# Patient Record
Sex: MALE | Race: WHITE | Employment: FULL TIME | URBAN - METROPOLITAN AREA
[De-identification: names, ages, dates, MRNs, and addresses within clinical notes are randomized per-mention and may not be internally consistent; named-entity substitution may affect disease eponyms.]

---

## 2019-04-10 ENCOUNTER — APPOINTMENT (OUTPATIENT)
Dept: GENERAL RADIOLOGY | Age: 19
End: 2019-04-10
Attending: PEDIATRICS
Payer: COMMERCIAL

## 2019-04-10 ENCOUNTER — HOSPITAL ENCOUNTER (EMERGENCY)
Age: 19
Discharge: HOME | End: 2019-04-10
Attending: PEDIATRICS
Payer: COMMERCIAL

## 2019-04-10 VITALS
TEMPERATURE: 97.3 F | OXYGEN SATURATION: 97 % | WEIGHT: 166.01 LBS | HEART RATE: 96 BPM | DIASTOLIC BLOOD PRESSURE: 81 MMHG | SYSTOLIC BLOOD PRESSURE: 131 MMHG | RESPIRATION RATE: 18 BRPM

## 2019-04-10 DIAGNOSIS — S69.91XA INJURY OF RIGHT HAND, INITIAL ENCOUNTER: Primary | ICD-10-CM

## 2019-04-10 PROCEDURE — 99281 EMR DPT VST MAYX REQ PHY/QHP: CPT

## 2019-04-10 PROCEDURE — 73130 X-RAY EXAM OF HAND: CPT

## 2019-04-10 NOTE — ED PROVIDER NOTES
HPI  
History of present illness: 
 
Patient is a 71-year-old male previously well presents with right hand injury. He states he was in his usual state of good health. He states he was hiking yesterday tripped on a tree stump fell over onto his right hand which was in a fist position. Since then he is complaining of metacarpal and digital pain over the lateral aspect of his hand and fourth digit. Pain is increased today. Is concerned that he may have fractured his hand. No head injury no loss of consciousness no neck pain or trouble breathing no abdominal pain. No other injuries. He states he drinks well without any issue. No medications or other concerns Review of systems: A 10 point review was conducted. All pertinent positives and negatives are as stated in history of present illness Allergies: None Medications: None Immunizations: Up to date Past medical history: Unremarkable Family history: Noncontributory to this visit Social history: This with family. Attends school. History reviewed. No pertinent past medical history. History reviewed. No pertinent surgical history. History reviewed. No pertinent family history. Social History Socioeconomic History  Marital status: Not on file Spouse name: Not on file  Number of children: Not on file  Years of education: Not on file  Highest education level: Not on file Occupational History  Not on file Social Needs  Financial resource strain: Not on file  Food insecurity:  
  Worry: Not on file Inability: Not on file  Transportation needs:  
  Medical: Not on file Non-medical: Not on file Tobacco Use  Smoking status: Not on file Substance and Sexual Activity  Alcohol use: Not on file  Drug use: Not on file  Sexual activity: Not on file Lifestyle  Physical activity:  
  Days per week: Not on file Minutes per session: Not on file  Stress: Not on file Relationships  Social connections:  
  Talks on phone: Not on file Gets together: Not on file Attends Zoroastrianism service: Not on file Active member of club or organization: Not on file Attends meetings of clubs or organizations: Not on file Relationship status: Not on file  Intimate partner violence:  
  Fear of current or ex partner: Not on file Emotionally abused: Not on file Physically abused: Not on file Forced sexual activity: Not on file Other Topics Concern  Not on file Social History Narrative  Not on file ALLERGIES: Patient has no known allergies. Review of Systems Constitutional: Negative for activity change and fever. HENT: Negative for ear pain and trouble swallowing. Eyes: Negative for visual disturbance. Respiratory: Negative for cough and shortness of breath. Cardiovascular: Negative for chest pain. Gastrointestinal: Negative for abdominal pain and vomiting. Genitourinary: Negative for decreased urine volume and dysuria. Musculoskeletal: Positive for joint swelling. Skin: Negative for rash. All other systems reviewed and are negative. Vitals:  
 04/10/19 1054 BP: 131/81 Pulse: 96  
Resp: 18 Temp: 97.3 °F (36.3 °C) SpO2: 97% Weight: 75.3 kg (166 lb 0.1 oz) Physical Exam  
Nursing note and vitals reviewed. PE: 
GEN:  WDWN male alert non toxic in NAD talkative interactive well appearing SK: CRT < 2 sec, good distal pulses. No lesions, no rashes HEENT: H: AT/NC. E: EOMI , PERRL, E: TM clear  N/T: Clear oropharynx NECK: supple, no meningismus. No pain on palpation Chest: Clear to auscultation, clear BS. NO rales, rhonchi, wheezes or distress. No  Retraction. Chest Wall: no tenderness on palpation CV: Regular rate and rhythm. Normal S1 S2 . No murmur, gallops or thrills ABD: Soft non tender, no hepatomegaly, good bowel sound, no guarding, benign MS: FROM all extremities, no long bone tenderness. No swelling, cyanosis, no edema. Good distal pulses. Gait normal 
Right hand: minimal STS over lateral hand, + ecchymosis of prox pharynx 4th digit, neurovasc iontact NEURO: Alert. No focality. Cranial nerves 2-12 grossly intact. GCS 15  Behavior and mentation appropriate for age MDM Number of Diagnoses or Management Options Injury of right hand, initial encounter:  
Diagnosis management comments: Medical decision making: 
 
Differential diagnosis includes: Fracture versus contusion X-ray: No fracture Home on symptomatic care with Motrin as needed Patient's results have been reviewed with them. Patient and /or family have verbally conveyed understanding and agreement of the patient's signs, symptoms, diagnosis, treatment and prognosis and additionally agree to follow up as recommended or return to the Emergency Department should their condition change prior to follow-up. Discharge instructions have also been provided to the patient with some educational information regarding their diagnosis as well as a list of reasons why they would want to return to the ER prior to their follow-up appointment should their condition change. Clinical impression: 
Right hand injury Amount and/or Complexity of Data Reviewed Tests in the radiology section of CPT®: ordered and reviewed Independent visualization of images, tracings, or specimens: yes Procedures

## 2019-04-10 NOTE — ED NOTES
Pt discharged home. Pt alert, respirations regular and unlabored, cap refill less than two seconds. Skin pink, dry and warm. Lungs clear bilaterally. No further complaints at this time. Patient verbalized understanding of discharge paperwork and has no further questions at this time. Education provided about continuation of care, follow up care and medication administration. Patient able to provided teach back about discharge instructions.

## 2019-04-10 NOTE — DISCHARGE INSTRUCTIONS
Follow up with your physician as needed. Return to the emergency department for any worsening symptoms, any trouble breathing, fevers, vomiting, swelling/numbness of hand or other new concerns. Contusion: Care Instructions  Your Care Instructions    Contusion is the medical term for a bruise. It is the result of a direct blow or an impact, such as a fall. Contusions are common sports injuries. Most people think of a bruise as a black-and-blue spot. This happens when small blood vessels get torn and leak blood under the skin. But bones, muscles, and organs can also get bruised. This may damage deep tissues but not cause a bruise you can see. The doctor will do a physical exam to find the location of your contusion. You may also have tests to make sure you do not have a more serious injury, such as a broken bone or nerve damage. These may include X-rays or other imaging tests like a CT scan or MRI. Deep-tissue contusions may cause pain and swelling. But if there is no serious damage, they will often get better in a few weeks with home treatment. The doctor has checked you carefully, but problems can develop later. If you notice any problems or new symptoms, get medical treatment right away. Follow-up care is a key part of your treatment and safety. Be sure to make and go to all appointments, and call your doctor if you are having problems. It's also a good idea to know your test results and keep a list of the medicines you take. How can you care for yourself at home? · Put ice or a cold pack on the sore area for 10 to 20 minutes at a time to stop swelling. Put a thin cloth between the ice pack and your skin. · Be safe with medicines. Read and follow all instructions on the label. ? If the doctor gave you a prescription medicine for pain, take it as prescribed. ? If you are not taking a prescription pain medicine, ask your doctor if you can take an over-the-counter medicine.   · If you can, prop up the sore area on pillows as much as possible for the next few days. Try to keep the sore area above the level of your heart. When should you call for help? Call your doctor now or seek immediate medical care if:    · Your pain gets worse.     · You have new or worse swelling.     · You have tingling, weakness, or numbness in the area near the contusion.     · The area near the contusion is cold or pale.    Watch closely for changes in your health, and be sure to contact your doctor if:    · You do not get better as expected. Where can you learn more? Go to http://kristen-agnes.info/. Enter S745 in the search box to learn more about \"Contusion: Care Instructions. \"  Current as of: September 23, 2018  Content Version: 11.9  © 6434-1067 [employer]. Care instructions adapted under license by [employer] (which disclaims liability or warranty for this information). If you have questions about a medical condition or this instruction, always ask your healthcare professional. [address] any warranty or liability for your use of this information. We hope that we have addressed all of your medical concerns. The examination and treatment you received in the Emergency Department were for an emergent problem and were not intended as complete care. It is important that you follow up with your healthcare provider(s) for ongoing care. If your symptoms worsen or do not improve as expected, and you are unable to reach your usual health care provider(s), you should return to the Emergency Department. Today's healthcare is undergoing tremendous change, and patient satisfaction surveys are one of the many tools to assess the quality of medical care. You may receive a survey from the [employer] organization regarding your experience in the Emergency Department.   I hope that your experience has been completely positive, particularly the medical care that I provided. As such, please participate in the survey; anything less than excellent does not meet my expectations or intentions. [address] [facility] and [facility]3] participate in nationally recognized quality of care measures. If your blood pressure is greater than 120/80, as reported below, we urge that you seek medical care to address the potential of high blood pressure, commonly known as hypertension. Hypertension can be hereditary or can be caused by certain medical conditions, pain, stress, or \"white coat syndrome. \"       Please make an appointment with your health care provider(s) for follow up of your Emergency Department visit. VITALS:   Patient Vitals for the past 8 hrs:   Temp Pulse Resp BP SpO2   04/10/19 1054 97.3 °F (36.3 °C) 96 18 131/81 97 %          Thank you for allowing us to provide you with medical care today. We realize that you have many choices for your emergency care needs. Please choose us in the future for any continued health care needs. MD TISHA Teixeira [facility]: [phone]            No results found for this or any previous visit (from the past 24 hour(s)). Xr Hand Rt Min 3 V    Result Date: 4/10/2019  EXAM: XR HAND RT MIN 3 V INDICATION: Pain at the base of the right hand fifth digit after fall. COMPARISON: None. FINDINGS: Three views of the right hand demonstrate no fracture or other acute osseous or articular abnormality. The soft tissues are within normal limits. IMPRESSION: No acute abnormality.

## 2019-04-10 NOTE — ED TRIAGE NOTES
Pt states he fell on his right hand last night. Pt reports swelling and pain to the right hand at the base of the right fifth finger.